# Patient Record
Sex: MALE | URBAN - METROPOLITAN AREA
[De-identification: names, ages, dates, MRNs, and addresses within clinical notes are randomized per-mention and may not be internally consistent; named-entity substitution may affect disease eponyms.]

---

## 2023-09-01 ENCOUNTER — EDUCATION (OUTPATIENT)
Dept: HEMATOLOGY/ONCOLOGY | Facility: CLINIC | Age: 63
End: 2023-09-01

## 2023-09-01 DIAGNOSIS — Z52.001 STEM CELL DONOR: Primary | ICD-10-CM

## 2023-09-01 NOTE — PROGRESS NOTES
"Spoke with Ender Guevarapuneet, potential donor for RECIPIENT name today via telephone. Discussed the fact that his  daughter  is in need of a stem cell transplant. Ender said that he was interested in possibly being the donor for his  daughter 's transplant. Briefly discussed the donation process. Explained to Ender that if he would match his  daughter , that his commitment to donate is very important and that he has the right to change his mind. Also explained however, that a late decision not to donate can be life threatening to the patient. Explained that he should think seriously about his commitment before he has his tissue typing drawn, and if he feels uncomfortable moving forward, that it is okay for him to say "no." Also informed Ender that if he is a perfect match for his  daughter , that we will contact him and ask if he is willing to donate. If he agrees to proceed, we will ask him about his health and schedule a physician appointment and more testing. He will receive a physical examination to be sure it is safe for him to donate and that his donation will provide the best possible outcome for the patient. We will follow medical guidelines that protect his health as a potential donor as well as the health of the transplant patient. Ender was given the opportunity to ask questions.   will send HLA tying kit.     "

## 2023-09-22 PROCEDURE — 81373 HLA I TYPING 1 LOCUS LR: CPT | Mod: 59,PO | Performed by: INTERNAL MEDICINE

## 2023-09-22 PROCEDURE — 81376 HLA II TYPING 1 LOCUS LR: CPT | Mod: 59,PO | Performed by: INTERNAL MEDICINE

## 2023-09-22 PROCEDURE — 81376 HLA II TYPING 1 LOCUS LR: CPT | Mod: PO | Performed by: INTERNAL MEDICINE

## 2023-09-22 PROCEDURE — 81373 HLA I TYPING 1 LOCUS LR: CPT | Mod: PO | Performed by: INTERNAL MEDICINE

## 2023-09-28 ENCOUNTER — LAB VISIT (OUTPATIENT)
Dept: LAB | Facility: HOSPITAL | Age: 63
End: 2023-09-28
Payer: COMMERCIAL

## 2023-09-28 DIAGNOSIS — Z52.001 STEM CELL DONOR: ICD-10-CM

## 2023-09-28 PROCEDURE — 36415 COLL VENOUS BLD VENIPUNCTURE: CPT | Performed by: INTERNAL MEDICINE

## 2023-09-28 PROCEDURE — 86901 BLOOD TYPING SEROLOGIC RH(D): CPT | Performed by: INTERNAL MEDICINE

## 2023-09-29 LAB
ABO GROUP BLD: NORMAL
RH BLD: NORMAL

## 2023-10-09 LAB — HLATY INTERPRETATION: NORMAL

## 2023-10-24 ENCOUNTER — TELEPHONE (OUTPATIENT)
Dept: HEMATOLOGY/ONCOLOGY | Facility: CLINIC | Age: 63
End: 2023-10-24
Payer: COMMERCIAL

## 2023-10-24 LAB
HLA DQA1 1: NORMAL
HLA DQA1 2: NORMAL
HLA DRB4 1: NORMAL
HLA REALTIMEPCR TYPING CLASSI&II INTERPRETATION: NORMAL
HLA-A 1 SERO. EQUIV: 2
HLA-A 1: NORMAL
HLA-A 2 SERO. EQUIV: 33
HLA-A 2: NORMAL
HLA-B 1 SERO. EQUIV: 65
HLA-B 1: NORMAL
HLA-B 2 SERO. EQUIV: 44
HLA-B 2: NORMAL
HLA-BW 1 SERO. EQUIV: 4
HLA-BW 2 SERO. EQUIV: 6
HLA-C 1: NORMAL
HLA-C 2: NORMAL
HLA-CW 1 SERO. EQUIV: 5
HLA-CW 2 SERO. EQUIV: 8
HLA-DPA1 1: NORMAL
HLA-DPA1 2: NORMAL
HLA-DPB1 1: NORMAL
HLA-DPB1 2: NORMAL
HLA-DQ 1 SERO. EQUIV: 7
HLA-DQ 2 SERO. EQUIV: 8
HLA-DQB1 1: NORMAL
HLA-DQB1 2: NORMAL
HLA-DRB1 1 SERO. EQUIV: 4
HLA-DRB1 1: NORMAL
HLA-DRB1 2 SERO. EQUIV: NORMAL
HLA-DRB1 2: NORMAL
HLA-DRB3 1: NORMAL
HLA-DRB3 2: NORMAL
HLA-DRB345 1 SERO. EQUIV: 53
HLA-DRB345 2 SERO. EQUIV: NORMAL
HLA-DRB4 2: NORMAL
HLA-DRB5 1: NORMAL
HLA-DRB5 2: NORMAL
RTPCR TESTING DATE: NORMAL

## 2025-09-06 ENCOUNTER — OFFICE VISIT (OUTPATIENT)
Dept: URGENT CARE | Facility: CLINIC | Age: 65
End: 2025-09-06
Payer: COMMERCIAL

## 2025-09-06 VITALS
WEIGHT: 190 LBS | SYSTOLIC BLOOD PRESSURE: 153 MMHG | HEIGHT: 70 IN | BODY MASS INDEX: 27.2 KG/M2 | DIASTOLIC BLOOD PRESSURE: 93 MMHG | HEART RATE: 82 BPM | OXYGEN SATURATION: 97 % | RESPIRATION RATE: 18 BRPM | TEMPERATURE: 99 F

## 2025-09-06 DIAGNOSIS — I10 HYPERTENSION, UNSPECIFIED TYPE: Primary | ICD-10-CM

## 2025-09-06 RX ORDER — AMLODIPINE BESYLATE 5 MG/1
5 TABLET ORAL DAILY
Qty: 14 TABLET | Refills: 0 | Status: SHIPPED | OUTPATIENT
Start: 2025-09-06 | End: 2025-09-20